# Patient Record
(demographics unavailable — no encounter records)

---

## 2024-10-22 NOTE — HISTORY OF PRESENT ILLNESS
[FreeTextEntry1] : This 61 year old female patient presents to office for pain of both feet and ankles. She states she has history of falls that had hurt each of her feet and ankles t times, and does recall rolling her ankles in the past. She states for the last few months she gets pains that will come and go to both feet and ankles, mainly on the outsides and to the ankle front areas. Patient also complains of thick, irregular toe nails that cause pain to her toes at times.

## 2024-10-22 NOTE — ASSESSMENT
[FreeTextEntry1] : Exam. Xrays obtained 2 views of b/l feet and 2 views of b/l ankles as noted above, reviewed and discussed with the patient. Rx for diagnostic US of b/l ankles and foot.  Applied Elastoplast strapping with LA pad to the patient's b/l feet. Instructed patient to wear supportive sneakers, and not walk barefoot. Instructed patient to rest, ice and elevate the affected areas prn. Instructed the patient to refrain from high impact activity at this time. Obtained nail biopsy of b/l 5th toe dystrophic toe nails, will review results with patient when available. Aseptic debridement of dystrophic toe nails with sterile nippers and electric ty to patient's tolerance. Patient demonstrated verbal understanding of all instructions. Patient to return to office in 1 week.

## 2024-10-22 NOTE — PHYSICAL EXAM
[General Appearance - Alert] : alert [General Appearance - In No Acute Distress] : in no acute distress [General Appearance - Well Nourished] : well nourished [de-identified] : pain on palpation of peroneal tendon course in the ankle and foot b/l, and with mild pain to palpation of anterior ankle b/l, with mild edema b/l, with pain on dorsiflexion b/l, with ROM WNL with 5/5 muscle strength to dorsiflexion, plantarflexion, inversion and eversion b/l. Xrays 2 views WB of b/l feet AP and Lat and b/l ankles AP and Lat- ankle joint paces clear b/l, no acute fractures or dislocations to b/l feet [FreeTextEntry1] : SWMF 10/10 b/l, light touch intact WNL b/l, Achilles tendon reflex intact b/l

## 2024-10-22 NOTE — REVIEW OF SYSTEMS
[Negative] : Constitutional [FreeTextEntry9] : pain to both fete and ankles [de-identified] : thick. painful toe nails

## 2024-10-22 NOTE — REVIEW OF SYSTEMS
Discussed AMD, pt's wife, [facility], is the health care proxy, he will complete AMD later and bring copy to chart. [Negative] : Constitutional [FreeTextEntry9] : pain to both fete and ankles [de-identified] : thick. painful toe nails

## 2024-10-22 NOTE — PHYSICAL EXAM
[General Appearance - Alert] : alert [General Appearance - In No Acute Distress] : in no acute distress [General Appearance - Well Nourished] : well nourished [de-identified] : pain on palpation of peroneal tendon course in the ankle and foot b/l, and with mild pain to palpation of anterior ankle b/l, with mild edema b/l, with pain on dorsiflexion b/l, with ROM WNL with 5/5 muscle strength to dorsiflexion, plantarflexion, inversion and eversion b/l. Xrays 2 views WB of b/l feet AP and Lat and b/l ankles AP and Lat- ankle joint paces clear b/l, no acute fractures or dislocations to b/l feet [FreeTextEntry1] : SWMF 10/10 b/l, light touch intact WNL b/l, Achilles tendon reflex intact b/l

## 2024-10-22 NOTE — REASON FOR VISIT
[Initial Visit] : an initial visit for [FreeTextEntry2] : pain to feet and ankles and to thick, irregular toe nails

## 2024-11-13 NOTE — REASON FOR VISIT
[Follow-Up Visit] : a follow-up visit for [FreeTextEntry2] : pain to feet and ankles and to thick, irregular toe nails

## 2024-11-13 NOTE — PHYSICAL EXAM
[General Appearance - Alert] : alert [General Appearance - In No Acute Distress] : in no acute distress [General Appearance - Well Nourished] : well nourished [de-identified] : Pain on palpation of peroneal tendon course in the ankle and foot b/l, mild pain to palpation of anterior ankle b/l, with Edema to b/l foot and ankle, with pain on dorsiflexion b/l, with ROM WNL 5/5 muscle strength to dorsiflexion, plantarflexion, inversion and eversion b/l. [FreeTextEntry1] : SWMF 10/10 b/l, light touch intact WNL b/l, Achilles tendon reflex intact b/l

## 2024-11-13 NOTE — REVIEW OF SYSTEMS
[Negative] : Constitutional [FreeTextEntry9] : pain to both feet and ankles [de-identified] : thick. painful toe nails

## 2024-11-13 NOTE — ASSESSMENT
[FreeTextEntry1] : Examination. Reviewed and discussed with the patient her b/l lower extremity US results, full report reviewed and discussed with the patient and scanned into her chart-  b/l peroneus longus tendinosis, with no tears noted b/l. Applied Unna boot to b/l lower extremity, with CFTs noted to be instantaneous. Instructed patient not to get Unna boots wet and to remove Unna boots the radha splint if any numbness, tingling, burning, CFT delay or changes in toe color occur. Instructed the patient to remove the Unna boots in one week. Rx for physical therapy, discussed at length with patient, instructed her to call and schedule for 2 weeks for now, to set up appointment for after next visit and after Unna boots, patient demonstrated verbal understanding. Instructed patient to wear supportive sneakers, and not walk barefoot. Instructed patient to rest, ice and elevate the affected areas prn. Instructed the patient to refrain from high impact activity at this time. Reviewed and discussed with the patient the results of nail biopsy of b/l 5th toe dystrophic toe nails, onychomycosis. Discussed onychomycosis and treatment options at length. Rx ciclopirox, instructed the patient on the use of the medication. Instructed the patient to dry toe nails well, and change damp or wet sock and shoes to dry ones. Patient demonstrated verbal understanding of all instructions. Patient to return to office in 1 week.

## 2024-11-13 NOTE — HISTORY OF PRESENT ILLNESS
[FreeTextEntry1] : 61 year old female patient returns to office for follow up care of pain of both feet and ankles. Patient states the foot and ankle pain has been a little less, as she has been following all instructions as per previous visit. She states that the thick toe nails cause her pain, especially in shoes.

## 2024-11-26 NOTE — ASSESSMENT
[FreeTextEntry1] : Exam. Discussed patient's condition with the patient at length. Discussed with patient that her edema decreased well and that her pain is decreasing. Discussed plan for PT with patient, she sates she made an appointment for next week, instructed her to begin with PT as planned. Instructed the patient to wear supportive sneakers, and not walk barefoot. Instructed patient to rest, ice and elevate the affected areas prn. Instructed patient not to do high impact activity at this time. Instructed the patient to continue to apply ciclopirox daily, reviewed the instructions for use with the patient. Instructed the patient to dry her toe nails well, and change damp or wet sock and shoes to dry ones. Patient demonstrated verbal understanding of all instructions. Patient to return to our office in 3 weeks.

## 2024-11-26 NOTE — REVIEW OF SYSTEMS
[Negative] : Constitutional [FreeTextEntry9] : pain to both feet and ankles [de-identified] : thick. painful toe nails

## 2024-11-26 NOTE — HISTORY OF PRESENT ILLNESS
[FreeTextEntry1] : This 61 year old female patient returns today for continued up care of pain of both feet and ankles. Patient states the foot and ankle pain has decreased a bit with the unna boots, but is still present. Patient states that she has been applying the ciclopirox as instructed.

## 2024-11-26 NOTE — PHYSICAL EXAM
[General Appearance - Alert] : alert [General Appearance - In No Acute Distress] : in no acute distress [General Appearance - Well Nourished] : well nourished [de-identified] : Pain on palpation of peroneal tendon course in the ankle and foot b/l, mild pain noted to palpation of anterior ankle b/l. Minimal edema noted to b/l foot and ankle, with pain on dorsiflexion b/l. ROM WNL 5/5 muscle strength to dorsiflexion, plantarflexion, inversion and eversion b/l. [FreeTextEntry1] : SWMF 10/10 b/l, light touch intact WNL b/l, Achilles tendon reflex intact b/l

## 2025-04-11 NOTE — ASSESSMENT
[FreeTextEntry1] : Obtained and reviewed pulmonary function test and NiOx results with patient today. PFT was suggestive of mild restrictive lung disease, and NiOx was remarkable for active inflammation.  Dr. Watkins's pulmonary recommendation: At this point, Katiana has been strongly advised to take her Symbicort, two puffs BID, every day for asthma treatment. Return for pulmonary follow up   Dr. Watkins's sleep recommendation: Based on history and physical exam KATIANA has a high likelihood of having obstructive sleep apnea. Further assessment by sleep testing is recommended. There is no contraindication to a home sleep study. Dr. Watkins therefore recommends KATIANA proceed to conduct a two-night home sleep apnea study for further assessment. Dr. Watkins counseled KATIANA on the logistics of the home sleep study. KATIANA should return for follow up with Dr. Watkins 2 weeks after completing the study.

## 2025-04-11 NOTE — ASSESSMENT
[FreeTextEntry1] : 1- check SC urine 2- discussed microhematra ronn l- CT ( non due to allergy ) and cysto  3- discussed malodorous urie reasons

## 2025-04-11 NOTE — HISTORY OF PRESENT ILLNESS
[TextBox_4] : Ms. KATIANA BRADY is a 61 year-old female, with history of asthma, hypothyroidism, who presents to the office today for initial pulmonary evaluation. Patient presents with regard to a history of asthma and snoring. Katiana states that her asthma has been dificult to control despite trying several inhalers. She also reports that she sometimes has chest discomfort and wakes up in the middle of the night gasping for air. She does snore and would like to be evaluated for sleep apnea. Katiana does have intermittent wheezing but denies any at this time. She denies any cough, sputum production, wheeze, or dyspnea. Katiana does have Symbicort but she does not take it on a regular basis.

## 2025-04-11 NOTE — SIGNATURES
[TextEntry] : This note was written by Pako Peralta on 04/11/2025 acting as medical scribe for Dr. Evy Watkins. I, Dr. Evy Watkins, have read and attest that all the information, medical decision making and discharge instructions within are true and accurate.

## 2025-04-11 NOTE — PROCEDURE
[FreeTextEntry1] : Pulmonary Function Test performed today, 04/11/2025, in my office: Spirometry: Suggestive of mild restrictive defect. Lung Volume: Within normal limits with air trapping. Diffusion: Within normal limits. ____________________________________________________________ Exhaled Nitric Oxide             Final  No Documents Attached    	Test  	Result  	Flag	Reference	Goal	Last Verified  	Exhaled Nitric Oxide	39	 	 		REQUIRED  Ordered by: NIRU CANO       Collected/Examined: 11Apr2025 03:40PM       Verification Required       Stage: Final       Performed at: Other       Performed by: NIRU CANO       Resulted: 11Apr2025 03:39PM       Last Updated: 11Apr2025 03:40PM

## 2025-04-11 NOTE — PHYSICAL EXAM
[Normal Appearance] : normal appearance [Well Groomed] : well groomed [General Appearance - In No Acute Distress] : no acute distress [Edema] : no peripheral edema [Respiration, Rhythm And Depth] : normal respiratory rhythm and effort [Exaggerated Use Of Accessory Muscles For Inspiration] : no accessory muscle use [Abdomen Soft] : soft [Abdomen Tenderness] : non-tender [Costovertebral Angle Tenderness] : no ~M costovertebral angle tenderness [Urethral Meatus] : normal urethra [External Female Genitalia] : normal external genitalia [Vagina] : normal vaginal exam [Normal Station and Gait] : the gait and station were normal for the patient's age [] : no rash [No Focal Deficits] : no focal deficits [Oriented To Time, Place, And Person] : oriented to person, place, and time [Affect] : the affect was normal [Mood] : the mood was normal [No Palpable Adenopathy] : no palpable adenopathy [de-identified] : SC after sterile prep with15 f cath andurine collected and cath renoved intact- brendan prpcedure [MA] : MA [FreeTextEntry2] : Pamela Candelaria

## 2025-04-11 NOTE — HISTORY OF PRESENT ILLNESS
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 BLADDER: Within normal limits. REPRODUCTIVE ORGANS: The uterus has been removed. The adnexa are unremarkable by CT criteria.

## 2025-04-30 NOTE — PHYSICAL EXAM
[Chaperoned Physical Exam] : A chaperone was present in the examining room during all aspects of the physical examination. [MA] : MA [FreeTextEntry2] : Anushka [Normal] : vagina [No Bleeding] : there was no active vaginal bleeding [Absent] : absent [Uterine Adnexae] : were not tender and not enlarged

## 2025-06-13 NOTE — HISTORY OF PRESENT ILLNESS
[N] : Patient is not sexually active [Y] : Positive pregnancy history [Menarche Age: ____] : age at menarche was [unfilled] [Menopause Age: ____] : age at menopause was [unfilled] [FreeTextEntry1] : 62-year-old -0-1-2 status post KERRI none undergoing pelvic floor therapy for pelvic pain and reports improvement. [PGHxTotal] : 3 [[facility]] : 2 [PGHxPremature] : 0 [PGHxAbortions] : 1 [[facility]] : 2 [PGHxABInduced] : 0 [PGHxABSpont] : 1 [PGHxEctopic] : 0 [PGHxMultBirths] : 0

## 2025-06-13 NOTE — PLAN
[FreeTextEntry1] : 62-year-old -0-1-2 status post hysterectomy undergoing pelvic floor physical therapy which is helping with the pelvic pain.  Pap GC chlamydia sent and mammogram reviewed as normal.  Return in 6 months for follow-up and all questions answered.

## 2025-06-13 NOTE — PHYSICAL EXAM
[Chaperoned Physical Exam] : A chaperone was present in the examining room during all aspects of the physical examination. [MA] : MA [Appropriately responsive] : appropriately responsive [Alert] : alert [No Acute Distress] : no acute distress [No Lymphadenopathy] : no lymphadenopathy [Regular Rate Rhythm] : regular rate rhythm [No Murmurs] : no murmurs [Clear to Auscultation B/L] : clear to auscultation bilaterally [Soft] : soft [Non-tender] : non-tender [Non-distended] : non-distended [No HSM] : No HSM [No Lesions] : no lesions [No Mass] : no mass [Oriented x3] : oriented x3 [Examination Of The Breasts] : a normal appearance [No Masses] : no breast masses were palpable [Vulvar Atrophy] : vulvar atrophy [Labia Majora] : normal [Labia Minora] : normal [Normal] : normal [Atrophy] : atrophy [Dry Mucosa] : dry mucosa [Absent] : absent [Uterine Adnexae] : normal [Declined] : Patient declined rectal exam [FreeTextEntry2] : Estelle

## 2025-06-20 NOTE — DISCUSSION/SUMMARY
[FreeTextEntry1] : Discussed PFD, CPP and GSM with Katiana today. I reviewed the pathologies, possible etiologies, diagnosis, symptoms and treatment options available. Written information was given to the patient.  Estradiol 0.01% topical cream ftp to introitus qhs (may use PV 2x/week ftp amt). Amount and location of cream application was demonstrated to patient today in office via mirror demonstration. I explained that the cream does not work overnight, and she needs to continue it for a minimum of 6-8 weeks before we re-evaluate efficacy.  Baclofen 20mg 1 po BID. May take 1/2 tab (10mg) in am, 1/2 tab (10mg) in afternoon and 1 tab (20mg) at bed if too sedating. SE including but not limited to drowsiness, dizziness, nausea, h/a, constipation, itching rev'd with patient. She verbalized understanding.  PFM relaxation techniques rev'd in detail: -Warm baths x15-20 mins QD with 2 cups Epsom salt or Aveeno oatmeal -No pushing, straining -Aggressive constipation control       *MagO7       *Increase water intake -No Kegels, no squeezing  Continue PFPT with Suzi Marie in [place].   RTO 3 months

## 2025-06-20 NOTE — PHYSICAL EXAM
[MA] : MA [No Acute Distress] : in no acute distress [Well developed] : well developed [Well Nourished] : ~L well nourished [Good Hygeine] : demonstrates good hygeine [Oriented x3] : oriented to person, place, and time [Normal Memory] : ~T memory was ~L unimpaired [Normal Mood/Affect] : mood and affect are normal [Respirations regular] : ~T respiratory rate was regular [Warm and Dry] : was warm and dry to touch [Normal Gait] : gait was normal [No Lesions] : no lesions were seen on the external genitalia [Vulvar Atrophy] : vulvar atrophy [Labia Majora] : were normal [Normal Appearance] : general appearance was normal [Atrophy] : atrophy [No Bleeding] : there was no active vaginal bleeding [Normal] : no abnormalities [Exam Deferred] : was deferred [FreeTextEntry2] : mauricio camacho [No Edema] : ~T edema was present [Tenderness] : ~T no ~M abdominal tenderness observed [Distended] : not distended [de-identified] : Q-tip touch test 5/10 @ 1,5,6,7,11 with paleness at introitus and reactive erythema at ostia. No ulcerations, erosions, fissures. 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 Derm changes ?vLS vs. GSM [FreeTextEntry4] : PFMs 4/4 with MTrPs b/l levators, all groups and b/l OI. Able to replicate R sided pain with palpation of OI. +pain to palpation

## 2025-06-20 NOTE — LETTER BODY
[Dear  ___] : Dear ~ALICE, [I had the pleasure of evaluating your patient, [unfilled]. Thank you for referring Ms. [unfilled] for consultation for ___] : I had the pleasure of evaluating your patient, [unfilled]. Thank you for referring Ms. [unfilled] for consultation for [unfilled]. [Attached please find my note.] : Attached please find my note. [Thank you very much for allowing me to participate in the care of this patient. If you have any questions, please do not hesitate to contact me] : Thank you very much for allowing me to participate in the care of this patient. If you have any questions, please do not hesitate to contact me. [DrRick  ___] : Dr. JAY

## 2025-06-20 NOTE — HISTORY OF PRESENT ILLNESS
[FreeTextEntry1] : Hysterectomy 2004 (has ovaries). Has had pelvic pain since hysterectomy. Initially was intermittent, however has become constant over the past couple of years.  Pain shooting, stabbing, spasms (R>L).  No improvement with heat or cold.  Standing, prolonged sitting, walking makes pain worse.   Menopause = 2004 6/2023 Pelvic US wnl 8/2023 CT abdomen/pelvis for R lateral abd pain wnl No meds for pain. Paps wnl x BV 6/23, 6/24, 6/25 [place] denies all symptoms of BV (no discharge, odor, itching, vulvar burning). Mammograms wnl  Not sexually active x 16 years No dyspareunia when she was active.  No vaginal bleeding.  No daytime frequency, nocturia x0-1, some ANDERSON when sick w cough, no hematuria, sometimes with feeling incomplete emptying, no hesitancy.  No kidney stones, RUTIs. Saw Dr. Diop for  w/u (negative).  +constipation takes prunes Recommended colonoscopy but pt refuses.  Hypothroidism (synthroid 112) Asthma